# Patient Record
Sex: MALE | Race: WHITE | NOT HISPANIC OR LATINO | Employment: FULL TIME | URBAN - METROPOLITAN AREA
[De-identification: names, ages, dates, MRNs, and addresses within clinical notes are randomized per-mention and may not be internally consistent; named-entity substitution may affect disease eponyms.]

---

## 2022-09-07 ENCOUNTER — LAB (OUTPATIENT)
Dept: LAB | Facility: CLINIC | Age: 37
End: 2022-09-07
Attending: FAMILY MEDICINE
Payer: COMMERCIAL

## 2022-09-07 DIAGNOSIS — Z20.822 CLOSE EXPOSURE TO 2019 NOVEL CORONAVIRUS: ICD-10-CM

## 2022-09-07 PROCEDURE — U0005 INFEC AGEN DETEC AMPLI PROBE: HCPCS

## 2022-09-07 PROCEDURE — U0003 INFECTIOUS AGENT DETECTION BY NUCLEIC ACID (DNA OR RNA); SEVERE ACUTE RESPIRATORY SYNDROME CORONAVIRUS 2 (SARS-COV-2) (CORONAVIRUS DISEASE [COVID-19]), AMPLIFIED PROBE TECHNIQUE, MAKING USE OF HIGH THROUGHPUT TECHNOLOGIES AS DESCRIBED BY CMS-2020-01-R: HCPCS

## 2022-09-08 LAB — SARS-COV-2 RNA RESP QL NAA+PROBE: NEGATIVE

## 2022-09-19 ENCOUNTER — E-VISIT (OUTPATIENT)
Dept: FAMILY MEDICINE | Facility: CLINIC | Age: 37
End: 2022-09-19
Payer: COMMERCIAL

## 2022-09-19 DIAGNOSIS — U07.1 SARS-COV-2 POSITIVE: Primary | ICD-10-CM

## 2022-09-19 PROCEDURE — 99421 OL DIG E/M SVC 5-10 MIN: CPT | Mod: CS | Performed by: PHYSICIAN ASSISTANT

## 2022-09-19 NOTE — LETTER
September 19, 2022      Dorie Payton  [address] [address]      To Whom It May Concern:  Please excuse patient until they receive a negative COVID test. Upon negative test, they may return.         Sincerely,        Joce Nuñez PA-C

## 2022-10-11 ENCOUNTER — IMMUNIZATION (OUTPATIENT)
Dept: FAMILY MEDICINE | Facility: CLINIC | Age: 37
End: 2022-10-11
Payer: COMMERCIAL

## 2022-10-11 DIAGNOSIS — Z23 NEED FOR PROPHYLACTIC VACCINATION AND INOCULATION AGAINST INFLUENZA: ICD-10-CM

## 2022-10-11 DIAGNOSIS — Z23 HIGH PRIORITY FOR 2019-NCOV VACCINE: Primary | ICD-10-CM

## 2022-10-11 PROCEDURE — 91313 COVID-19,PF,MODERNA BIVALENT: CPT

## 2022-10-11 PROCEDURE — 90686 IIV4 VACC NO PRSV 0.5 ML IM: CPT

## 2022-10-11 PROCEDURE — 90471 IMMUNIZATION ADMIN: CPT

## 2022-10-11 PROCEDURE — 99207 PR NO CHARGE LOS: CPT

## 2022-10-11 PROCEDURE — 0134A COVID-19,PF,MODERNA BIVALENT: CPT

## 2022-10-12 ENCOUNTER — ANCILLARY PROCEDURE (OUTPATIENT)
Dept: GENERAL RADIOLOGY | Facility: CLINIC | Age: 37
End: 2022-10-12
Attending: FAMILY MEDICINE
Payer: COMMERCIAL

## 2022-10-12 ENCOUNTER — OFFICE VISIT (OUTPATIENT)
Dept: FAMILY MEDICINE | Facility: CLINIC | Age: 37
End: 2022-10-12
Payer: COMMERCIAL

## 2022-10-12 VITALS
OXYGEN SATURATION: 95 % | RESPIRATION RATE: 20 BRPM | BODY MASS INDEX: 43.01 KG/M2 | HEART RATE: 87 BPM | DIASTOLIC BLOOD PRESSURE: 86 MMHG | HEIGHT: 71 IN | SYSTOLIC BLOOD PRESSURE: 138 MMHG | WEIGHT: 307.2 LBS | TEMPERATURE: 99.1 F

## 2022-10-12 DIAGNOSIS — M25.521 RIGHT ELBOW PAIN: Primary | ICD-10-CM

## 2022-10-12 DIAGNOSIS — M25.521 RIGHT ELBOW PAIN: ICD-10-CM

## 2022-10-12 PROCEDURE — 99203 OFFICE O/P NEW LOW 30 MIN: CPT | Performed by: FAMILY MEDICINE

## 2022-10-12 PROCEDURE — 73080 X-RAY EXAM OF ELBOW: CPT | Mod: TC | Performed by: RADIOLOGY

## 2022-10-12 ASSESSMENT — PAIN SCALES - GENERAL: PAINLEVEL: MILD PAIN (2)

## 2022-10-12 NOTE — PROGRESS NOTES
"  Assessment & Plan     Right elbow pain  37-year-old male presented with right ankle pain which she has been experiencing for last 2 months or so.  No history of trauma/injury or fall.  Works in IT.  Physical examination remarkable for localized tenderness involving right lateral and anterior elbow.  No joint swelling, skin discoloration or decreased range of movement.  Differentials discussed in detail including tendinopathy.  Elbow x-ray findings reviewed independently which came back unremarkable.  Suggested over-the-counter oral/topical analgesia, activity as tolerated, elbow brace and orthopedic referral placed for further review and recommendations.  Patient understood and in agreement with above plan.  All questions answered.  - Orthopedic  Referral; Future  - XR Elbow Right G/E 3 Views; Future      Jefferson Canales MD  [facility]    Mirlande Garnica is a 37 year old, presenting for the following health issues:  Elbow Pain (/)      Elbow Pain    History of Present Illness       Reason for visit:  Right elbow pain  Symptom onset:  3-4 weeks ago  Symptoms include:  Slight shooting pain to hand during movement, pain when picking things up.  Symptom intensity:  Moderate  Symptom progression:  Staying the same  Had these symptoms before:  Yes  Has tried/received treatment for these symptoms:  No        Review of Systems   Constitutional, HEENT, cardiovascular, pulmonary, gi and gu systems are negative, except as otherwise noted.      Objective    /86 (BP Location: Right arm, Patient Position: Sitting, Cuff Size: Adult Large)   Pulse 87   Temp 99.1  F (37.3  C) (Tympanic)   Resp 20   Ht 1.803 m (5' 11\")   Wt 139.3 kg (307 lb 3.2 oz)   SpO2 95%   BMI 42.85 kg/m    Body mass index is 42.85 kg/m .  Physical Exam   GENERAL: alert and no distress  RESP: no wheezes  MS: mildly localized right lateral elbow tenderness, no joint swelling, skin discoloration or " decreased range of movement, normal upper and lower extremities strength, peripheral pulses 3+ bilaterally  SKIN: no suspicious lesions or rashes  NEURO: Normal strength and tone, mentation intact and speech normal  PSYCH: mentation appears normal, affect normal/bright    Results for orders placed or performed in visit on 10/12/22   XR Elbow Right G/E 3 Views     Status: None    Narrative    XR ELBOW RIGHT G/E 3 VIEWS   10/12/2022 4:18 PM     HISTORY:  Right elbow pain    Comparison: None.      Impression    IMPRESSION: Normal. No fracture or effusion.    ABBY NORTON MD         SYSTEM ID:  [id number]

## 2022-11-04 ENCOUNTER — OFFICE VISIT (OUTPATIENT)
Dept: ORTHOPEDICS | Facility: CLINIC | Age: 37
End: 2022-11-04
Payer: COMMERCIAL

## 2022-11-04 VITALS
WEIGHT: 307 LBS | SYSTOLIC BLOOD PRESSURE: 132 MMHG | HEIGHT: 71 IN | BODY MASS INDEX: 42.98 KG/M2 | DIASTOLIC BLOOD PRESSURE: 74 MMHG

## 2022-11-04 DIAGNOSIS — M67.921 BICEPS TENDINOPATHY, RIGHT: Primary | ICD-10-CM

## 2022-11-04 DIAGNOSIS — M77.11 LATERAL EPICONDYLITIS OF RIGHT ELBOW: ICD-10-CM

## 2022-11-04 DIAGNOSIS — M25.521 RIGHT ELBOW PAIN: ICD-10-CM

## 2022-11-04 PROCEDURE — 99203 OFFICE O/P NEW LOW 30 MIN: CPT | Performed by: FAMILY MEDICINE

## 2022-11-04 NOTE — PROGRESS NOTES
ASSESSMENT & PLAN    Nahun was seen today for pain.    Diagnoses and all orders for this visit:    Right elbow pain  -     Orthopedic  Referral      This issue is acute and Worsening.    # Right Elbow Pain: Patient noting symptoms over the past few months without inciting injury. He does have pain over the distal biceps tendon as well as the common extensor tendon the outside of his elbow. There is pain with resisted biceps tendon, extensor tendon testing on exam today. X-rays reviewed with patient showing no significant arthritis, no fracture. Likely cause of his pain due to irritated biceps and common extensor tendon in his elbow. Counseled patient treatment options including home exercises, hand therapy. Given this plan to treat as below and follow-up as needed..  Image Findings: negative right elbow  Treatment: Activities as tolerated, home exercises given today  Job: as tolerated  Medications/Injections: Limited tylenol/ibuprofen for pain for 1-2 weeks, none  Follow-up: In one month if symptoms do not improve, sooner if worsening  Can consider formal hand therapy    Jc Ramos MD  [facility] [place]    -----  Chief Complaint   Patient presents with     Right Elbow - Pain       SUBJECTIVE  Nahun Payton is a/an 37 year old male who is seen in consultation at the request of  Jefferson Canales M.D. for evaluation of right elbow pain.     The patient is seen by themselves.  The patient is Right handed    Onset: 2-3 month(s) ago. Reports insidious onset without acute precipitating event. Saw PCP 10/12/22 and xrays were performed. Reviewed PCP note and right elbow x-rays  Location of Pain: right lateral and anterior elbow  Worsened by: full extension, supination, gripping, lifting   Better with: nothing   Treatments tried: rest/activity avoidance, Ibuprofen, Aleve   Associated symptoms: numbness and tingling    Orthopedic/Surgical history: NO  Social  "History/Occupation: IT    No family history pertinent to patient's problem today.      REVIEW OF SYSTEMS:  Review of Systems  Constitutional, HEENT, cardiovascular, pulmonary, GI, , musculoskeletal, neuro, skin, endocrine and psych systems are negative, except as otherwise noted.    OBJECTIVE:  Ht 1.803 m (5' 11\")   Wt 139.3 kg (307 lb)   BMI 42.82 kg/m     General: healthy, alert and in no distress  HEENT: no scleral icterus or conjunctival erythema  Skin: no suspicious lesions or rash. No jaundice.  CV: distal perfusion intact    Resp: normal respiratory effort without conversational dyspnea   Psych: normal mood and affect  Gait: normal steady gait with appropriate coordination and balance    Neuro: Normal light sensory exam of right upper extremity     Ortho Exam   RIGHT ELBOW  Inspection:    No swelling, bruising, discoloration, or obvious deformity or asymmetry  Palpation:    Tender about the lateral epicondyle, common extensor tendon, distal bicep tendon. Remainder of bony, ligamentous and tendinous landmarks are nontender.    Crepitus is Absent  Range of Motion:     Extension full / flexion full / pronation full / supination full  Strength:    Flexion full extension full pronation full supination limited slightly by pain  Special Tests:    Positive: Pain with resisted wrist extension, pain with resisted middle finger extension, pain with resisted supination    Negative: pain with resisted wrist flexion, passive valgus stress, pain with resisted pronation, milking manuever, varus stress, cubital tunnel (ulnar Tinel's)      RADIOLOGY:  I independently, visualized and reviewed these images with the patient     XR ELBOW RIGHT G/E 3 VIEWS   10/12/2022 4:18 PM      HISTORY:  Right elbow pain     Comparison: None.                                                                      IMPRESSION: Normal. No fracture or effusion.     ABBY NORTON MD     Review of external notes as documented elsewhere in " note  Review of the result(s) of each unique test - right elbow x-rays       Disclaimer: This note consists of symbols derived from keyboarding, dictation and/or voice recognition software. As a result, there may be errors in the script that have gone undetected. Please consider this when interpreting information found in this chart.

## 2022-11-04 NOTE — PATIENT INSTRUCTIONS
# Right Elbow Pain: Patient noting symptoms over the past few months without inciting injury. He does have pain over the distal biceps tendon as well as the common extensor tendon the outside of his elbow. There is pain with resisted biceps tendon, extensor tendon testing on exam today. X-rays reviewed with patient showing no significant arthritis, no fracture. Likely cause of his pain due to irritated biceps and common extensor tendon in his elbow. Counseled patient treatment options including home exercises, hand therapy. Given this plan to treat as below and follow-up as needed..  Image Findings: negative right elbow  Treatment: Activities as tolerated, home exercises given today  Job: as tolerated  Medications/Injections: Limited tylenol/ibuprofen for pain for 1-2 weeks, none  Follow-up: In one month if symptoms do not improve, sooner if worsening  Can consider formal hand therapy    Please call [phone]   Ask for my team if you have any questions or concerns    If you have not yet received the influenza vaccine but would like to get one, please call  [phone] or you can schedule via [employer]    It was great seeing you today!    Jc Ramos MD, CAM

## 2022-11-04 NOTE — LETTER
11/4/2022         RE: Nahun Payton  [address] [facility] [address]        Dear Colleague,    Thank you for referring your patient, Nahun Payton, to the [facility]. Please see a copy of my visit note below.    ASSESSMENT & PLAN    Nahun was seen today for pain.    Diagnoses and all orders for this visit:    Right elbow pain  -     Orthopedic  Referral      This issue is acute and Worsening.    # Right Elbow Pain: Patient noting symptoms over the past few months without inciting injury. He does have pain over the distal biceps tendon as well as the common extensor tendon the outside of his elbow. There is pain with resisted biceps tendon, extensor tendon testing on exam today. X-rays reviewed with patient showing no significant arthritis, no fracture. Likely cause of his pain due to irritated biceps and common extensor tendon in his elbow. Counseled patient treatment options including home exercises, hand therapy. Given this plan to treat as below and follow-up as needed..  Image Findings: negative right elbow  Treatment: Activities as tolerated, home exercises given today  Job: as tolerated  Medications/Injections: Limited tylenol/ibuprofen for pain for 1-2 weeks, none  Follow-up: In one month if symptoms do not improve, sooner if worsening  Can consider formal hand therapy    Jc Ramos MD  [facility]    -----  Chief Complaint   Patient presents with     Right Elbow - Pain       SUBJECTIVE  Nahun Payton is a/an 37 year old male who is seen in consultation at the request of  Jefferson Canales M.D. for evaluation of right elbow pain.     The patient is seen by themselves.  The patient is Right handed    Onset: 2-3 month(s) ago. Reports insidious onset without acute precipitating event. Saw PCP 10/12/22 and xrays were performed. Reviewed PCP note and right elbow  "x-rays  Location of Pain: right lateral and anterior elbow  Worsened by: full extension, supination, gripping, lifting   Better with: nothing   Treatments tried: rest/activity avoidance, Ibuprofen, Aleve   Associated symptoms: numbness and tingling    Orthopedic/Surgical history: NO  Social History/Occupation: IT    No family history pertinent to patient's problem today.      REVIEW OF SYSTEMS:  Review of Systems  Constitutional, HEENT, cardiovascular, pulmonary, GI, , musculoskeletal, neuro, skin, endocrine and psych systems are negative, except as otherwise noted.    OBJECTIVE:  Ht 1.803 m (5' 11\")   Wt 139.3 kg (307 lb)   BMI 42.82 kg/m     General: healthy, alert and in no distress  HEENT: no scleral icterus or conjunctival erythema  Skin: no suspicious lesions or rash. No jaundice.  CV: distal perfusion intact    Resp: normal respiratory effort without conversational dyspnea   Psych: normal mood and affect  Gait: normal steady gait with appropriate coordination and balance    Neuro: Normal light sensory exam of right upper extremity     Ortho Exam   RIGHT ELBOW  Inspection:    No swelling, bruising, discoloration, or obvious deformity or asymmetry  Palpation:    Tender about the lateral epicondyle, common extensor tendon, distal bicep tendon. Remainder of bony, ligamentous and tendinous landmarks are nontender.    Crepitus is Absent  Range of Motion:     Extension full / flexion full / pronation full / supination full  Strength:    Flexion full extension full pronation full supination limited slightly by pain  Special Tests:    Positive: Pain with resisted wrist extension, pain with resisted middle finger extension, pain with resisted supination    Negative: pain with resisted wrist flexion, passive valgus stress, pain with resisted pronation, milking manuever, varus stress, cubital tunnel (ulnar Tinel's)      RADIOLOGY:  I independently, visualized and reviewed these images with the patient     XR ELBOW " RIGHT G/E 3 VIEWS   10/12/2022 4:18 PM      HISTORY:  Right elbow pain     Comparison: None.                                                                      IMPRESSION: Normal. No fracture or effusion.     ABBY NORTON MD     Review of external notes as documented elsewhere in note  Review of the result(s) of each unique test - right elbow x-rays       Disclaimer: This note consists of symbols derived from keyboarding, dictation and/or voice recognition software. As a result, there may be errors in the script that have gone undetected. Please consider this when interpreting information found in this chart.        Again, thank you for allowing me to participate in the care of your patient.        Sincerely,        Jc Ramos MD

## 2022-11-19 ENCOUNTER — HEALTH MAINTENANCE LETTER (OUTPATIENT)
Age: 37
End: 2022-11-19

## 2023-04-24 ASSESSMENT — ENCOUNTER SYMPTOMS
HEMATOCHEZIA: 0
ARTHRALGIAS: 0
FEVER: 0
JOINT SWELLING: 0
ABDOMINAL PAIN: 0
HEARTBURN: 0
DYSURIA: 0
EYE PAIN: 0
WEAKNESS: 0
HEMATURIA: 0
COUGH: 0
SORE THROAT: 0
DIZZINESS: 0
SHORTNESS OF BREATH: 0
CHILLS: 0
NAUSEA: 0
MYALGIAS: 0
DIARRHEA: 0
FREQUENCY: 0
HEADACHES: 0
CONSTIPATION: 0
PARESTHESIAS: 0
PALPITATIONS: 0
NERVOUS/ANXIOUS: 0

## 2023-04-25 ENCOUNTER — OFFICE VISIT (OUTPATIENT)
Dept: FAMILY MEDICINE | Facility: CLINIC | Age: 38
End: 2023-04-25
Payer: COMMERCIAL

## 2023-04-25 VITALS
RESPIRATION RATE: 18 BRPM | HEIGHT: 71 IN | OXYGEN SATURATION: 98 % | TEMPERATURE: 97.8 F | WEIGHT: 309 LBS | BODY MASS INDEX: 43.26 KG/M2 | DIASTOLIC BLOOD PRESSURE: 82 MMHG | SYSTOLIC BLOOD PRESSURE: 120 MMHG | HEART RATE: 55 BPM

## 2023-04-25 DIAGNOSIS — Z11.4 SCREENING FOR HIV (HUMAN IMMUNODEFICIENCY VIRUS): ICD-10-CM

## 2023-04-25 DIAGNOSIS — Z00.00 ROUTINE GENERAL MEDICAL EXAMINATION AT A HEALTH CARE FACILITY: Primary | ICD-10-CM

## 2023-04-25 DIAGNOSIS — Z11.4 ENCOUNTER FOR SCREENING FOR HIV: ICD-10-CM

## 2023-04-25 DIAGNOSIS — Z11.59 NEED FOR HEPATITIS C SCREENING TEST: ICD-10-CM

## 2023-04-25 PROCEDURE — 90715 TDAP VACCINE 7 YRS/> IM: CPT | Performed by: FAMILY MEDICINE

## 2023-04-25 PROCEDURE — 99395 PREV VISIT EST AGE 18-39: CPT | Mod: 25 | Performed by: FAMILY MEDICINE

## 2023-04-25 PROCEDURE — 90471 IMMUNIZATION ADMIN: CPT | Performed by: FAMILY MEDICINE

## 2023-04-25 ASSESSMENT — PAIN SCALES - GENERAL: PAINLEVEL: NO PAIN (0)

## 2023-04-25 ASSESSMENT — ENCOUNTER SYMPTOMS
DIZZINESS: 0
ARTHRALGIAS: 0
HEMATOCHEZIA: 0
ACTIVITY CHANGE: 0
BRUISES/BLEEDS EASILY: 0
NERVOUS/ANXIOUS: 0
PARESTHESIAS: 0
DYSURIA: 0
BACK PAIN: 0
ADENOPATHY: 0
HEMATURIA: 0
APPETITE CHANGE: 0
EYE PAIN: 0
CONSTIPATION: 0
HEARTBURN: 0
DIARRHEA: 0
PALPITATIONS: 0
FREQUENCY: 0
AGITATION: 0
SHORTNESS OF BREATH: 0

## 2023-04-25 NOTE — PROGRESS NOTES
SUBJECTIVE:   CC: Nahun is an 37 year old who presents for preventative health visit.   Patient has been advised of split billing requirements and indicates understanding: Yes  Healthy Habits:     Getting at least 3 servings of Calcium per day:  Yes    Bi-annual eye exam:  NO    Dental care twice a year:  Yes    Sleep apnea or symptoms of sleep apnea:  None    Diet:  Regular (no restrictions)    Frequency of exercise:  None    Taking medications regularly:  Not Applicable    Barriers to taking medications:  Not applicable    Medication side effects:  Not applicable    PHQ-2 Total Score: 0    Additional concerns today:  Yes      PROBLEMS TO ADD ON...      Today's PHQ-2 Score:       4/25/2023     4:18 PM   PHQ-2 ( 1999 [employer])   Q1: Little interest or pleasure in doing things 0   Q2: Feeling down, depressed or hopeless 0   PHQ-2 Score 0   Q1: Little interest or pleasure in doing things Not at all   Q2: Feeling down, depressed or hopeless Not at all   PHQ-2 Score 0       Have you ever done Advance Care Planning? (For example, a Health Directive, POLST, or a discussion with a medical provider or your loved ones about your wishes): No, advance care planning information given to patient to review.  Patient declined advance care planning discussion at this time.    Social History     Tobacco Use     Smoking status: Never     Passive exposure: Never     Smokeless tobacco: Never   Vaping Use     Vaping status: Every Day   Substance Use Topics     Alcohol use: Yes             4/24/2023     6:13 PM   Alcohol Use   Prescreen: >3 drinks/day or >7 drinks/week? No       Last PSA: No results found for: PSA    Reviewed orders with patient. Reviewed health maintenance and updated orders accordingly - Yes  Lab work is in process  Labs reviewed in EPIC  BP Readings from Last 3 Encounters:   04/25/23 120/82   11/04/22 132/74   10/12/22 138/86    Wt Readings from Last 3 Encounters:   04/25/23 140.2 kg (309 lb)   11/04/22 139.3 kg (307  "lb)   10/12/22 139.3 kg (307 lb 3.2 oz)                  There is no problem list on file for this patient.    No past surgical history on file.    Social History     Tobacco Use     Smoking status: Never     Passive exposure: Never     Smokeless tobacco: Never   Vaping Use     Vaping status: Every Day   Substance Use Topics     Alcohol use: Yes     No family history on file.      No current outpatient medications on file.     No Known Allergies  No lab results found.     Reviewed and updated as needed this visit by clinical staff   Tobacco  Allergies  Meds              Reviewed and updated as needed this visit by Provider                     Review of Systems   Constitutional: Negative for activity change and appetite change.   HENT: Negative for congestion, ear pain and hearing loss.    Eyes: Negative for pain and visual disturbance.   Respiratory: Negative for shortness of breath.    Cardiovascular: Negative for palpitations and peripheral edema.   Gastrointestinal: Negative for constipation, diarrhea, heartburn and hematochezia.   Endocrine: Negative for cold intolerance and heat intolerance.   Genitourinary: Negative for dysuria, frequency, genital sores, hematuria, impotence, penile discharge and urgency.   Musculoskeletal: Negative for arthralgias and back pain.   Allergic/Immunologic: Negative for environmental allergies and food allergies.   Neurological: Negative for dizziness and paresthesias.   Hematological: Negative for adenopathy. Does not bruise/bleed easily.   Psychiatric/Behavioral: Negative for agitation, behavioral problems and mood changes. The patient is not nervous/anxious.          OBJECTIVE:   /82 (Cuff Size: Adult Large)   Pulse 55   Temp 97.8  F (36.6  C) (Tympanic)   Resp 18   Ht 1.803 m (5' 11\")   Wt 140.2 kg (309 lb)   SpO2 98%   BMI 43.10 kg/m      Physical Exam  GENERAL: alert, no distress and obese  EYES: Eyes grossly normal to inspection, PERRL and conjunctivae and " "sclerae normal  HENT: ear canals and TM's normal, nose and mouth without ulcers or lesions  NECK: no adenopathy, no asymmetry, masses, or scars and thyroid normal to palpation  RESP: lungs clear to auscultation - no rales, rhonchi or wheezes  CV: regular rate and rhythm, normal S1 S2, no S3 or S4, no murmur, click or rub, no peripheral edema and peripheral pulses strong  ABDOMEN: soft, nontender, no hepatosplenomegaly, no masses and bowel sounds normal  MS: no gross musculoskeletal defects noted, no edema  SKIN: no suspicious lesions or rashes  NEURO: Normal strength and tone, mentation intact and speech normal  PSYCH: mentation appears normal, affect normal/bright    Wt Readings from Last 10 Encounters:   04/25/23 140.2 kg (309 lb)   11/04/22 139.3 kg (307 lb)   10/12/22 139.3 kg (307 lb 3.2 oz)       ASSESSMENT/PLAN:   (Z00.00) Routine general medical examination at a health care facility  (primary encounter diagnosis)  Comment: Medically doing well.  Physical examination unremarkable.  Recommended regular exercise, healthy diet and weight loss.  Fasting glucose, lipid panel, HIV, hepatitis C screening test ordered.  Tdap vaccine administered in office today.  Recommended to avoid vaping  Plan: Lipid panel reflex to direct LDL Fasting,         Glucose            (Z11.4) Screening for HIV (human immunodeficiency virus)  Comment:   Plan:     (Z11.59) Need for hepatitis C screening test  Comment:   Plan: Hepatitis C Screen Reflex to HCV RNA Quant and         Genotype            (Z11.4) Encounter for screening for HIV  Comment:   Plan: HIV Antigen Antibody Combo              COUNSELING:   Reviewed preventive health counseling, as reflected in patient instructions      BMI:   Estimated body mass index is 43.1 kg/m  as calculated from the following:    Height as of this encounter: 1.803 m (5' 11\").    Weight as of this encounter: 140.2 kg (309 lb).   Weight management plan: Discussed healthy diet and exercise " guidelines      He reports that he has never smoked. He has never been exposed to tobacco smoke. He has never used smokeless tobacco.        Jefferson Canales MD  [facility]

## 2023-04-25 NOTE — NURSING NOTE
"Chief Complaint   Patient presents with     Physical     /82 (Cuff Size: Adult Large)   Pulse 55   Temp 97.8  F (36.6  C) (Tympanic)   Resp 18   Ht 1.803 m (5' 11\")   Wt 140.2 kg (309 lb)   SpO2 98%   BMI 43.10 kg/m   Estimated body mass index is 43.1 kg/m  as calculated from the following:    Height as of this encounter: 1.803 m (5' 11\").    Weight as of this encounter: 140.2 kg (309 lb).  Patient presents to the clinic using No DME      Health Maintenance that is potentially due pending provider review:    Health Maintenance Due   Topic Date Due     NICOTINE/TOBACCO CESSATION COUNSELING Q 1 YR  Never done     HIV SCREENING  Never done     HEPATITIS C SCREENING  Never done     LIPID  Never done     DTAP/TDAP/TD IMMUNIZATION (2 - Td or Tdap) 02/04/2021     COVID-19 Vaccine (2 - Moderna series) 10/11/2022                "

## 2023-05-10 ENCOUNTER — LAB (OUTPATIENT)
Dept: LAB | Facility: CLINIC | Age: 38
End: 2023-05-10
Payer: COMMERCIAL

## 2023-05-10 DIAGNOSIS — Z00.00 ROUTINE GENERAL MEDICAL EXAMINATION AT A HEALTH CARE FACILITY: ICD-10-CM

## 2023-05-10 DIAGNOSIS — Z11.59 NEED FOR HEPATITIS C SCREENING TEST: ICD-10-CM

## 2023-05-10 DIAGNOSIS — Z11.4 ENCOUNTER FOR SCREENING FOR HIV: ICD-10-CM

## 2023-05-10 LAB
CHOLEST SERPL-MCNC: 174 MG/DL
FASTING STATUS PATIENT QL REPORTED: YES
GLUCOSE SERPL-MCNC: 103 MG/DL (ref 70–99)
HDLC SERPL-MCNC: 45 MG/DL
LDLC SERPL CALC-MCNC: 102 MG/DL
NONHDLC SERPL-MCNC: 129 MG/DL
TRIGL SERPL-MCNC: 134 MG/DL

## 2023-05-10 PROCEDURE — 87389 HIV-1 AG W/HIV-1&-2 AB AG IA: CPT

## 2023-05-10 PROCEDURE — 80061 LIPID PANEL: CPT

## 2023-05-10 PROCEDURE — 82947 ASSAY GLUCOSE BLOOD QUANT: CPT

## 2023-05-10 PROCEDURE — 36415 COLL VENOUS BLD VENIPUNCTURE: CPT

## 2023-05-10 PROCEDURE — 86803 HEPATITIS C AB TEST: CPT

## 2023-05-11 LAB
HCV AB SERPL QL IA: NONREACTIVE
HIV 1+2 AB+HIV1 P24 AG SERPL QL IA: NONREACTIVE

## 2023-09-11 ENCOUNTER — IMMUNIZATION (OUTPATIENT)
Dept: FAMILY MEDICINE | Facility: CLINIC | Age: 38
End: 2023-09-11
Payer: COMMERCIAL

## 2023-09-11 PROCEDURE — 90686 IIV4 VACC NO PRSV 0.5 ML IM: CPT

## 2023-09-11 PROCEDURE — 90471 IMMUNIZATION ADMIN: CPT

## 2023-10-27 ENCOUNTER — OFFICE VISIT (OUTPATIENT)
Dept: URGENT CARE | Facility: URGENT CARE | Age: 38
End: 2023-10-27
Payer: COMMERCIAL

## 2023-10-27 ENCOUNTER — ANCILLARY PROCEDURE (OUTPATIENT)
Dept: GENERAL RADIOLOGY | Facility: CLINIC | Age: 38
End: 2023-10-27
Attending: PHYSICIAN ASSISTANT
Payer: COMMERCIAL

## 2023-10-27 VITALS
OXYGEN SATURATION: 96 % | BODY MASS INDEX: 42.12 KG/M2 | RESPIRATION RATE: 16 BRPM | HEART RATE: 71 BPM | SYSTOLIC BLOOD PRESSURE: 125 MMHG | TEMPERATURE: 98 F | WEIGHT: 302 LBS | DIASTOLIC BLOOD PRESSURE: 84 MMHG

## 2023-10-27 DIAGNOSIS — R05.1 ACUTE COUGH: Primary | ICD-10-CM

## 2023-10-27 DIAGNOSIS — R05.1 ACUTE COUGH: ICD-10-CM

## 2023-10-27 PROCEDURE — 99214 OFFICE O/P EST MOD 30 MIN: CPT | Performed by: PHYSICIAN ASSISTANT

## 2023-10-27 PROCEDURE — 71046 X-RAY EXAM CHEST 2 VIEWS: CPT | Mod: TC | Performed by: RADIOLOGY

## 2023-10-27 RX ORDER — ALBUTEROL SULFATE 90 UG/1
AEROSOL, METERED RESPIRATORY (INHALATION)
Qty: 18 G | Refills: 0 | Status: SHIPPED | OUTPATIENT
Start: 2023-10-27

## 2023-10-27 RX ORDER — PREDNISONE 20 MG/1
40 TABLET ORAL DAILY
Qty: 10 TABLET | Refills: 0 | Status: SHIPPED | OUTPATIENT
Start: 2023-10-27 | End: 2023-11-01

## 2023-10-27 NOTE — PROGRESS NOTES
"  Assessment & Plan     Acute cough  Reassurance, chest x-ray is clear. Will treat with prednisone burst and albuterol every 4-6 hrs as needed. Continue with supportive care. Return to clinic if symptoms worsen or do not improve; otherwise follow up as needed     - XR Chest 2 Views; Future  - albuterol (PROAIR HFA/PROVENTIL HFA/VENTOLIN HFA) 108 (90 Base) MCG/ACT inhaler; Inhale 2 puffs every 4-6 hours as needed for cough, wheezing, or shortness of breath  - predniSONE (DELTASONE) 20 MG tablet; Take 2 tablets (40 mg) by mouth daily for 5 days             BMI:   Estimated body mass index is 42.12 kg/m  as calculated from the following:    Height as of 4/25/23: 1.803 m (5' 11\").    Weight as of this encounter: 137 kg (302 lb).           Return in about 1 week (around 11/3/2023), or if symptoms worsen or fail to improve.    MARGO Desai [facility] [place]              Subjective   Chief Complaint   Patient presents with    Cough     X 1 week, short of breath, coughs mostly at night, keeps him up at night, hears some crackling in lungs, feels like something on his chest       HPI     URI Adult    Onset of symptoms was 1 week(s) ago.  Course of illness is worsening.    Severity moderate  Current and Associated symptoms: cough, shortness of breath   Treatment measures tried include OTC Cough med.  Predisposing factors include None.                  Review of Systems         Objective    /84   Pulse 71   Temp 98  F (36.7  C) (Tympanic)   Resp 16   Wt 137 kg (302 lb)   SpO2 96%   BMI 42.12 kg/m    Body mass index is 42.12 kg/m .  Physical Exam  Constitutional:       General: He is not in acute distress.     Appearance: He is well-developed.   HENT:      Head: Normocephalic and atraumatic.      Right Ear: Tympanic membrane and ear canal normal.      Left Ear: Tympanic membrane and ear canal normal.   Eyes:      Conjunctiva/sclera: Conjunctivae normal.   Cardiovascular:      Rate " and Rhythm: Normal rate and regular rhythm.   Pulmonary:      Effort: Pulmonary effort is normal.      Breath sounds: Normal breath sounds.   Skin:     General: Skin is warm and dry.      Findings: No rash.   Psychiatric:         Behavior: Behavior normal.            Xray - Reviewed and interpreted by me.  No acute infiltrates

## 2024-06-16 ENCOUNTER — HEALTH MAINTENANCE LETTER (OUTPATIENT)
Age: 39
End: 2024-06-16

## 2024-11-25 ENCOUNTER — IMMUNIZATION (OUTPATIENT)
Dept: FAMILY MEDICINE | Facility: CLINIC | Age: 39
End: 2024-11-25
Payer: COMMERCIAL

## 2024-11-25 PROCEDURE — 90471 IMMUNIZATION ADMIN: CPT

## 2024-11-25 PROCEDURE — 90480 ADMN SARSCOV2 VAC 1/ONLY CMP: CPT

## 2024-11-25 PROCEDURE — 91320 SARSCV2 VAC 30MCG TRS-SUC IM: CPT

## 2024-11-25 PROCEDURE — 90656 IIV3 VACC NO PRSV 0.5 ML IM: CPT

## 2024-12-26 ENCOUNTER — OFFICE VISIT (OUTPATIENT)
Dept: URGENT CARE | Facility: URGENT CARE | Age: 39
End: 2024-12-26
Payer: COMMERCIAL

## 2024-12-26 VITALS
OXYGEN SATURATION: 97 % | HEART RATE: 68 BPM | TEMPERATURE: 97.8 F | WEIGHT: 315 LBS | SYSTOLIC BLOOD PRESSURE: 141 MMHG | BODY MASS INDEX: 44.49 KG/M2 | DIASTOLIC BLOOD PRESSURE: 92 MMHG | RESPIRATION RATE: 16 BRPM

## 2024-12-26 DIAGNOSIS — R10.31 RLQ ABDOMINAL PAIN: Primary | ICD-10-CM

## 2024-12-27 NOTE — PROGRESS NOTES
URGENT CARE  Assessment & Plan   Assessment:   Nahun Payton is a 39 year old male who's clinical presentation today is consistent with:   1. RLQ abdominal pain   Plan:  Sending patient to ED for higher level of care; given their acute abdominal pain.   Educated patient there is a possibility something more serious is happening with regard to their abdominal pain, however at the  we do not have access CT scan, will self drive     ROSITA Muñoz [facility] URGENT CARE [place]      ______________________________________________________________________      Subjective     HPI: Nahun Payton  is a 39 year old  male who presents today for evaluation the following concerns:   Patient presents today complaining of abdominal pain, which started 2-3 days ago   Patient localizes the pain to the RLQ   Patient states the onset was  insidious} in nature   And they describe their pain as sharp at times but can be intermittent    Patient denies any decreased appetite, nausea, vomiting, diarrhea, constipation, blood in stool,} dysuria, frequency, blood in urine, fever, chills, denies changes to bowels       Review of Systems:  Pertinent review of systems as reflected in HPI, otherwise negative.     Objective    Physical Exam:  Vitals:    12/26/24 1826   BP: (!) 141/92   Pulse: 68   Resp: 16   Temp: 97.8  F (36.6  C)   TempSrc: Tympanic   SpO2: 97%   Weight: 144.7 kg (319 lb)      General:   alert and oriented, no acute distress, non ill-appearing   Vital signs reviewed: afebrile and normotensive   ABD: Bowel sounds hypoactive  in all  quadrants   soft,  non-distended, obese   Tender to palpation RLQ    No rebound tenderness appreciated   No tympany, no organomegaly, no masses palpable,   ______________________________________________________________________    I explained my diagnostic considerations and recommendations to the patient, who voiced understanding and agreement with the treatment  plan.   All questions were answered.   We discussed potential side effects, risks and benefits of any prescribed or recommended therapies, as well as expectations for response to treatments.  Please see AVS for any patient instructions & handouts given.   Patient was advised to contact the Nurse Care Line, their Primary Care provider, Urgent Care, or the Emergency Department if there are new or worsening symptoms, or call 911 for emergencies.

## 2025-01-03 ENCOUNTER — APPOINTMENT (OUTPATIENT)
Dept: CT IMAGING | Facility: CLINIC | Age: 40
End: 2025-01-03
Payer: COMMERCIAL

## 2025-01-03 ENCOUNTER — HOSPITAL ENCOUNTER (EMERGENCY)
Facility: CLINIC | Age: 40
Discharge: HOME | End: 2025-01-03
Payer: COMMERCIAL

## 2025-01-03 VITALS
RESPIRATION RATE: 20 BRPM | WEIGHT: 315 LBS | HEART RATE: 56 BPM | DIASTOLIC BLOOD PRESSURE: 99 MMHG | HEIGHT: 71 IN | TEMPERATURE: 98.1 F | OXYGEN SATURATION: 96 % | BODY MASS INDEX: 44.1 KG/M2 | SYSTOLIC BLOOD PRESSURE: 166 MMHG

## 2025-01-03 DIAGNOSIS — R10.33 PERIUMBILICAL ABDOMINAL PAIN: ICD-10-CM

## 2025-01-03 LAB
ALBUMIN SERPL BCG-MCNC: 4.9 G/DL (ref 3.5–5.2)
ALBUMIN UR-MCNC: 10 MG/DL
ALP SERPL-CCNC: 81 U/L (ref 40–150)
ALT SERPL W P-5'-P-CCNC: 59 U/L (ref 0–70)
ANION GAP SERPL CALCULATED.3IONS-SCNC: 14 MMOL/L (ref 7–15)
APPEARANCE UR: CLEAR
AST SERPL W P-5'-P-CCNC: 28 U/L (ref 0–45)
BASOPHILS # BLD AUTO: 0.1 10E3/UL (ref 0–0.2)
BASOPHILS NFR BLD AUTO: 1 %
BILIRUB SERPL-MCNC: 0.5 MG/DL
BILIRUB UR QL STRIP: NEGATIVE
BUN SERPL-MCNC: 13.8 MG/DL (ref 6–20)
CALCIUM SERPL-MCNC: 9.7 MG/DL (ref 8.8–10.4)
CHLORIDE SERPL-SCNC: 102 MMOL/L (ref 98–107)
COLOR UR AUTO: ABNORMAL
CREAT SERPL-MCNC: 0.93 MG/DL (ref 0.67–1.17)
EGFRCR SERPLBLD CKD-EPI 2021: >90 ML/MIN/1.73M2
EOSINOPHIL # BLD AUTO: 0.1 10E3/UL (ref 0–0.7)
EOSINOPHIL NFR BLD AUTO: 1 %
ERYTHROCYTE [DISTWIDTH] IN BLOOD BY AUTOMATED COUNT: 13.2 % (ref 10–15)
GLUCOSE SERPL-MCNC: 94 MG/DL (ref 70–99)
GLUCOSE UR STRIP-MCNC: NEGATIVE MG/DL
HCO3 SERPL-SCNC: 24 MMOL/L (ref 22–29)
HCT VFR BLD AUTO: 46 % (ref 40–53)
HGB BLD-MCNC: 15.1 G/DL (ref 13.3–17.7)
HGB UR QL STRIP: NEGATIVE
IMM GRANULOCYTES # BLD: 0.1 10E3/UL
IMM GRANULOCYTES NFR BLD: 1 %
KETONES UR STRIP-MCNC: NEGATIVE MG/DL
LEUKOCYTE ESTERASE UR QL STRIP: NEGATIVE
LIPASE SERPL-CCNC: 29 U/L (ref 13–60)
LYMPHOCYTES # BLD AUTO: 3.2 10E3/UL (ref 0.8–5.3)
LYMPHOCYTES NFR BLD AUTO: 37 %
MCH RBC QN AUTO: 26.4 PG (ref 26.5–33)
MCHC RBC AUTO-ENTMCNC: 32.8 G/DL (ref 31.5–36.5)
MCV RBC AUTO: 80 FL (ref 78–100)
MONOCYTES # BLD AUTO: 0.6 10E3/UL (ref 0–1.3)
MONOCYTES NFR BLD AUTO: 7 %
MUCOUS THREADS #/AREA URNS LPF: PRESENT /LPF
NEUTROPHILS # BLD AUTO: 4.6 10E3/UL (ref 1.6–8.3)
NEUTROPHILS NFR BLD AUTO: 53 %
NITRATE UR QL: NEGATIVE
NRBC # BLD AUTO: 0 10E3/UL
NRBC BLD AUTO-RTO: 0 /100
PH UR STRIP: 5.5 [PH] (ref 5–7)
PLATELET # BLD AUTO: 295 10E3/UL (ref 150–450)
POTASSIUM SERPL-SCNC: 4 MMOL/L (ref 3.4–5.3)
PROT SERPL-MCNC: 8 G/DL (ref 6.4–8.3)
RBC # BLD AUTO: 5.72 10E6/UL (ref 4.4–5.9)
RBC URINE: 1 /HPF
SODIUM SERPL-SCNC: 140 MMOL/L (ref 135–145)
SP GR UR STRIP: 1.02 (ref 1–1.03)
SQUAMOUS EPITHELIAL: <1 /HPF
UROBILINOGEN UR STRIP-MCNC: NORMAL MG/DL
WBC # BLD AUTO: 8.7 10E3/UL (ref 4–11)
WBC URINE: 1 /HPF

## 2025-01-03 PROCEDURE — 99285 EMERGENCY DEPT VISIT HI MDM: CPT | Mod: 25

## 2025-01-03 PROCEDURE — 74177 CT ABD & PELVIS W/CONTRAST: CPT

## 2025-01-03 PROCEDURE — 84132 ASSAY OF SERUM POTASSIUM: CPT

## 2025-01-03 PROCEDURE — 36415 COLL VENOUS BLD VENIPUNCTURE: CPT

## 2025-01-03 PROCEDURE — 85004 AUTOMATED DIFF WBC COUNT: CPT

## 2025-01-03 PROCEDURE — 250N000011 HC RX IP 250 OP 636

## 2025-01-03 PROCEDURE — 96374 THER/PROPH/DIAG INJ IV PUSH: CPT | Mod: 59

## 2025-01-03 PROCEDURE — 85014 HEMATOCRIT: CPT

## 2025-01-03 PROCEDURE — 250N000009 HC RX 250

## 2025-01-03 PROCEDURE — 84155 ASSAY OF PROTEIN SERUM: CPT

## 2025-01-03 PROCEDURE — 99284 EMERGENCY DEPT VISIT MOD MDM: CPT

## 2025-01-03 PROCEDURE — 83690 ASSAY OF LIPASE: CPT

## 2025-01-03 PROCEDURE — 81003 URINALYSIS AUTO W/O SCOPE: CPT

## 2025-01-03 RX ORDER — IOPAMIDOL 755 MG/ML
149 INJECTION, SOLUTION INTRAVASCULAR ONCE
Status: COMPLETED | OUTPATIENT
Start: 2025-01-03 | End: 2025-01-03

## 2025-01-03 RX ORDER — KETOROLAC TROMETHAMINE 15 MG/ML
15 INJECTION, SOLUTION INTRAMUSCULAR; INTRAVENOUS ONCE
Status: COMPLETED | OUTPATIENT
Start: 2025-01-03 | End: 2025-01-03

## 2025-01-03 RX ADMIN — SODIUM CHLORIDE 77 ML: 9 INJECTION, SOLUTION INTRAVENOUS at 12:46

## 2025-01-03 RX ADMIN — IOPAMIDOL 149 ML: 755 INJECTION, SOLUTION INTRAVENOUS at 12:46

## 2025-01-03 RX ADMIN — KETOROLAC TROMETHAMINE 15 MG: 15 INJECTION, SOLUTION INTRAMUSCULAR; INTRAVENOUS at 12:32

## 2025-01-03 ASSESSMENT — COLUMBIA-SUICIDE SEVERITY RATING SCALE - C-SSRS
6. HAVE YOU EVER DONE ANYTHING, STARTED TO DO ANYTHING, OR PREPARED TO DO ANYTHING TO END YOUR LIFE?: NO
2. HAVE YOU ACTUALLY HAD ANY THOUGHTS OF KILLING YOURSELF IN THE PAST MONTH?: NO
1. IN THE PAST MONTH, HAVE YOU WISHED YOU WERE DEAD OR WISHED YOU COULD GO TO SLEEP AND NOT WAKE UP?: NO

## 2025-01-03 ASSESSMENT — ACTIVITIES OF DAILY LIVING (ADL)
ADLS_ACUITY_SCORE: 41

## 2025-01-03 NOTE — ED TRIAGE NOTES
Pt reports RLQ/lower mid abdominal pain for one week. Denies nausea and is having normal BM. Was seen in urgent care.      Triage Assessment (Adult)       Row Name 01/03/25 1101          Triage Assessment    Airway WDL WDL        Respiratory WDL    Respiratory WDL WDL        Cardiac WDL    Cardiac WDL WDL        Cognitive/Neuro/Behavioral WDL    Cognitive/Neuro/Behavioral WDL WDL

## 2025-01-03 NOTE — ED PROVIDER NOTES
"History     Chief Complaint   Patient presents with    Abdominal Pain     RLQ/Lower mid one week       Nahun Payton is a 39 year old male with no significant pmhx who presents for evaluation of lower abdominal pain.  Patient states he developed intermittent periumbilical pain about a week and a half ago.  He was seen in urgent care but they recommended he go to the ER for evaluation.  His pain seemed to get better so he did not present for further evaluation.  However, his pain returned the last couple days and is becoming more persistent.  The pain is now in the infraumbilical and left lower quadrant areas.  It is worse with use of his abdominal wall muscles, but still present at rest.  He denies associated fever, chills, nausea/vomiting, dysuria, hematuria, diarrhea, constipation, hematochezia, testicular pain, scrotal swelling.  No history of prior abdominal surgeries.  No history of hernia.    Allergies:  No Known Allergies    Problem List:    There are no active problems to display for this patient.       Past Medical History:    No past medical history on file.    Past Surgical History:    Past Surgical History:   Procedure Laterality Date    ARTHROSCOPY KNEE Right        Family History:    Family History   Problem Relation Age of Onset    Myocardial Infarction Father     Alcoholism Father     Diabetes Paternal Grandmother        Social History:  Marital Status:  Single [1]  Social History     Tobacco Use    Smoking status: Never     Passive exposure: Never    Smokeless tobacco: Never   Vaping Use    Vaping status: Every Day   Substance Use Topics    Alcohol use: Yes    Drug use: Never        Medications:    albuterol (PROAIR HFA/PROVENTIL HFA/VENTOLIN HFA) 108 (90 Base) MCG/ACT inhaler          Physical Exam   BP: (!) 153/105  Pulse: 56  Temp: 98.1  F (36.7  C)  Resp: 20  Height: 180.3 cm (5' 11\")  Weight: 145.2 kg (320 lb)  SpO2: 96 %      Physical Exam  Vitals and nursing note reviewed. "   Constitutional:       General: He is not in acute distress.     Appearance: He is not ill-appearing, toxic-appearing or diaphoretic.   HENT:      Head: Normocephalic and atraumatic.   Eyes:      Extraocular Movements: Extraocular movements intact.      Pupils: Pupils are equal, round, and reactive to light.   Pulmonary:      Effort: Pulmonary effort is normal.   Abdominal:      Palpations: Abdomen is soft.      Tenderness: There is abdominal tenderness in the suprapubic area and left lower quadrant. There is no guarding or rebound. Negative signs include Richards's sign and McBurney's sign.   Skin:     General: Skin is warm.   Neurological:      General: No focal deficit present.      Mental Status: He is alert and oriented to person, place, and time.   Psychiatric:         Mood and Affect: Mood normal.         Behavior: Behavior normal.         ED Course        Procedures                    Results for orders placed or performed during the hospital encounter of 01/03/25 (from the past 24 hours)   CBC with Platelets & Differential    Narrative    The following orders were created for panel order CBC with Platelets & Differential.  Procedure                               Abnormality         Status                     ---------                               -----------         ------                     CBC with platelets and d...[[id number]]  Abnormal            Final result                 Please view results for these tests on the individual orders.   Comprehensive metabolic panel   Result Value Ref Range    Sodium 140 135 - 145 mmol/L    Potassium 4.0 3.4 - 5.3 mmol/L    Carbon Dioxide (CO2) 24 22 - 29 mmol/L    Anion Gap 14 7 - 15 mmol/L    Urea Nitrogen 13.8 6.0 - 20.0 mg/dL    Creatinine 0.93 0.67 - 1.17 mg/dL    GFR Estimate >90 >60 mL/min/1.73m2    Calcium 9.7 8.8 - 10.4 mg/dL    Chloride 102 98 - 107 mmol/L    Glucose 94 70 - 99 mg/dL    Alkaline Phosphatase 81 40 - 150 U/L    AST 28 0 - 45 U/L    ALT 59 0 -  70 U/L    Protein Total 8.0 6.4 - 8.3 g/dL    Albumin 4.9 3.5 - 5.2 g/dL    Bilirubin Total 0.5 <=1.2 mg/dL   CBC with platelets and differential   Result Value Ref Range    WBC Count 8.7 4.0 - 11.0 10e3/uL    RBC Count 5.72 4.40 - 5.90 10e6/uL    Hemoglobin 15.1 13.3 - 17.7 g/dL    Hematocrit 46.0 40.0 - 53.0 %    MCV 80 78 - 100 fL    MCH 26.4 (L) 26.5 - 33.0 pg    MCHC 32.8 31.5 - 36.5 g/dL    RDW 13.2 10.0 - 15.0 %    Platelet Count 295 150 - 450 10e3/uL    % Neutrophils 53 %    % Lymphocytes 37 %    % Monocytes 7 %    % Eosinophils 1 %    % Basophils 1 %    % Immature Granulocytes 1 %    NRBCs per 100 WBC 0 <1 /100    Absolute Neutrophils 4.6 1.6 - 8.3 10e3/uL    Absolute Lymphocytes 3.2 0.8 - 5.3 10e3/uL    Absolute Monocytes 0.6 0.0 - 1.3 10e3/uL    Absolute Eosinophils 0.1 0.0 - 0.7 10e3/uL    Absolute Basophils 0.1 0.0 - 0.2 10e3/uL    Absolute Immature Granulocytes 0.1 <=0.4 10e3/uL    Absolute NRBCs 0.0 10e3/uL   Lipase   Result Value Ref Range    Lipase 29 13 - 60 U/L   UA with Microscopic reflex to Culture    Specimen: Urine, Clean Catch   Result Value Ref Range    Color Urine Light Yellow Colorless, Straw, Light Yellow, Yellow    Appearance Urine Clear Clear    Glucose Urine Negative Negative mg/dL    Bilirubin Urine Negative Negative    Ketones Urine Negative Negative mg/dL    Specific Gravity Urine 1.021 1.003 - 1.035    Blood Urine Negative Negative    pH Urine 5.5 5.0 - 7.0    Protein Albumin Urine 10 (A) Negative mg/dL    Urobilinogen Urine Normal Normal, 2.0 mg/dL    Nitrite Urine Negative Negative    Leukocyte Esterase Urine Negative Negative    Mucus Urine Present (A) None Seen /LPF    RBC Urine 1 <=2 /HPF    WBC Urine 1 <=5 /HPF    Squamous Epithelials Urine <1 <=1 /HPF    Narrative    Urine Culture not indicated   CT Abdomen Pelvis w Contrast    Narrative    CT ABDOMEN & PELVIS WITH CONTRAST January 3, 2025 12:57 PM    CLINICAL HISTORY: Infraumbilical and left lower quadrant  pain.    TECHNIQUE: CT scan of the abdomen and pelvis was performed following  injection of IV contrast. Multiplanar reformats were obtained. Dose  reduction techniques were used.  CONTRAST: 149 mL Isovue 370.    COMPARISON: None.    FINDINGS:   LOWER CHEST: Normal.    HEPATOBILIARY: Fatty liver. No acute liver or gallbladder abnormality.    PANCREAS: Normal.    SPLEEN: Normal.    ADRENAL GLANDS: Normal.    KIDNEYS/BLADDER: No significant mass, stones, or hydronephrosis. There  are simple or benign cysts. No follow up is needed.    BOWEL: No obstruction. No acute inflammatory change is identified.  Normal appendix. No abscess or free air.    PELVIC ORGANS: Normal.    ADDITIONAL FINDINGS: None.    MUSCULOSKELETAL: Normal.      Impression    IMPRESSION: No acute abnormality identified.       Medications   ketorolac (TORADOL) injection 15 mg (15 mg Intravenous $Given 1/3/25 1232)   iopamidol (ISOVUE-370) solution 149 mL (149 mLs Intravenous $Given 1/3/25 1246)   sodium chloride 0.9 % bag 500mL for CT scan flush use (77 mLs As instructed $Given 1/3/25 1246)       Assessments & Plan (with Medical Decision Making)     I have reviewed the nursing notes.    I have reviewed the findings, diagnosis, plan and need for follow up with the patient.    Medical Decision Making  Nahun Patyon is a 39 year old male with no significant pmhx who presents for evaluation of abdominal pain.  Differential diagnoses include appendicitis, UTI, colitis, diverticulitis, hernia, abdominal wall strain.  Vital signs with hypertension at 153/105.  Patient is afebrile, he is not tachycardic, and he is satting 96% on room air with a regular respiratory rate and effort.    On examination patient is overall well-appearing, alert, no acute distress.  Abdomen is soft to palpation.  There is tenderness in the suprapubic area and left lower quadrant.  No guarding or rebound.  Negative Richards's, negative McBurney's point tenderness.  No palpable  mass or hernia. CMP negative for electrolyte abnormality, renal dysfunction, liver dysfunction. Lipase WNL, low suspicion for pancreatitis. CBC negative for leukocytosis or anemia. UA negative for nitrite, leuk est, RBC, WBC, and does not suggest infection or stone.  CT scan of the abdomen/pelvis was negative for any acute pathology, no findings to explain the patient's pain.    Overall workup is grossly unremarkable for acute pathology.  Patient's pain could be due to muscle strain, possible hernia.  Recommended symptomatic care with ibuprofen and Tylenol as needed, avoidance of heavy lifting or exertion or activities that worsen his pain.  Recommended a follow-up appointment with his primary care provider in the next couple weeks if his pain is not improving.  We discussed strict return precautions if he develops sudden severe pain, fever, vomiting, urinary or stool changes, or if other concerning symptoms develop.  Patient voiced understanding of the plan and had no further questions.      New Prescriptions    No medications on file       Final diagnoses:   Periumbilical abdominal pain       Ariane Witt PA-C  January 3, 2025  [facility] [place] EMERGENCY DEPT     Ariane Witt PA-C  01/03/25 3226

## 2025-01-20 ENCOUNTER — TELEPHONE (OUTPATIENT)
Dept: FAMILY MEDICINE | Facility: CLINIC | Age: 40
End: 2025-01-20

## 2025-01-20 ENCOUNTER — OFFICE VISIT (OUTPATIENT)
Dept: FAMILY MEDICINE | Facility: CLINIC | Age: 40
End: 2025-01-20
Payer: COMMERCIAL

## 2025-01-20 VITALS
HEIGHT: 71 IN | RESPIRATION RATE: 18 BRPM | SYSTOLIC BLOOD PRESSURE: 138 MMHG | WEIGHT: 315 LBS | BODY MASS INDEX: 44.1 KG/M2 | HEART RATE: 75 BPM | OXYGEN SATURATION: 98 % | DIASTOLIC BLOOD PRESSURE: 92 MMHG

## 2025-01-20 DIAGNOSIS — Z00.00 ROUTINE HISTORY AND PHYSICAL EXAMINATION OF ADULT: Primary | ICD-10-CM

## 2025-01-20 DIAGNOSIS — E66.01 MORBID OBESITY (H): ICD-10-CM

## 2025-01-20 DIAGNOSIS — R03.0 ELEVATED BLOOD PRESSURE READING WITHOUT DIAGNOSIS OF HYPERTENSION: ICD-10-CM

## 2025-01-20 PROCEDURE — 99395 PREV VISIT EST AGE 18-39: CPT | Performed by: FAMILY MEDICINE

## 2025-01-20 PROCEDURE — 99214 OFFICE O/P EST MOD 30 MIN: CPT | Mod: 25 | Performed by: FAMILY MEDICINE

## 2025-01-20 RX ORDER — SEMAGLUTIDE 0.5 MG/.5ML
0.5 INJECTION, SOLUTION SUBCUTANEOUS WEEKLY
Qty: 2 ML | Refills: 0 | Status: SHIPPED | OUTPATIENT
Start: 2025-02-17 | End: 2025-03-17

## 2025-01-20 RX ORDER — SEMAGLUTIDE 1 MG/.5ML
1 INJECTION, SOLUTION SUBCUTANEOUS WEEKLY
Qty: 6 ML | Refills: 3 | Status: SHIPPED | OUTPATIENT
Start: 2025-03-21

## 2025-01-20 RX ORDER — SEMAGLUTIDE 0.25 MG/.5ML
0.25 INJECTION, SOLUTION SUBCUTANEOUS WEEKLY
Qty: 2 ML | Refills: 0 | Status: SHIPPED | OUTPATIENT
Start: 2025-01-20 | End: 2025-02-17

## 2025-01-20 SDOH — HEALTH STABILITY: PHYSICAL HEALTH: ON AVERAGE, HOW MANY DAYS PER WEEK DO YOU ENGAGE IN MODERATE TO STRENUOUS EXERCISE (LIKE A BRISK WALK)?: 3 DAYS

## 2025-01-20 SDOH — HEALTH STABILITY: PHYSICAL HEALTH: ON AVERAGE, HOW MANY MINUTES DO YOU ENGAGE IN EXERCISE AT THIS LEVEL?: 30 MIN

## 2025-01-20 ASSESSMENT — SOCIAL DETERMINANTS OF HEALTH (SDOH): HOW OFTEN DO YOU GET TOGETHER WITH FRIENDS OR RELATIVES?: ONCE A WEEK

## 2025-01-20 ASSESSMENT — PAIN SCALES - GENERAL: PAINLEVEL_OUTOF10: NO PAIN (0)

## 2025-01-20 NOTE — PROGRESS NOTES
"Preventive Care Visit  [facility]  Jefferson Canales MD, Family Medicine  Jan 20, 2025      Assessment & Plan     Routine history and physical examination of adult  Blood pressure above target goal of less than 140/90.  Recommended regular exercise, healthy diet and weight loss.  Wegovy prescribed, will be helpful for controlling blood pressure as well.    Elevated blood pressure reading without diagnosis of hypertension  As above    Morbid obesity (H)  Weight management discussed and Wegovy prescribed, written information provided  - semaglutide-weight management (WEGOVY) 0.25 MG/0.5ML pen; Inject 0.5 mLs (0.25 mg) subcutaneously once a week for 28 days.  - Semaglutide-Weight Management (WEGOVY) 0.5 MG/0.5ML pen; Inject 0.5 mg subcutaneously once a week for 28 days.  - Semaglutide-Weight Management (WEGOVY) 1 MG/0.5ML pen; Inject 1 mg subcutaneously once a week.  - insulin pen needle (32G X 4 MM) 32G X 4 MM miscellaneous; Use 1 pen needles daily or as directed.      BMI  Estimated body mass index is 44.21 kg/m  as calculated from the following:    Height as of this encounter: 1.803 m (5' 11\").    Weight as of this encounter: 143.8 kg (317 lb).   Weight management plan: Discussed healthy diet and exercise guidelines    Counseling  Appropriate preventive services were addressed with this patient via screening, questionnaire, or discussion as appropriate for fall prevention, nutrition, physical activity, Tobacco-use cessation, social engagement, weight loss and cognition.  Checklist reviewing preventive services available has been given to the patient.  Reviewed patient's diet, addressing concerns and/or questions.   He is at risk for lack of exercise and has been provided with information to increase physical activity for the benefit of his well-being.         Mirlande Garnica is a 39 year old, presenting for the following:  Physical        1/20/2025     9:27 AM   Additional Questions "   Roomed by Ana Ren CMA   Accompanied by Self          HPI        1/20/2025   General Health   How would you rate your overall physical health? Good   Feel stress (tense, anxious, or unable to sleep) Not at all         1/20/2025   Nutrition   Three or more servings of calcium each day? (!) I DON'T KNOW   Diet: Regular (no restrictions)   How many servings of fruit and vegetables per day? (!) 0-1   How many sweetened beverages each day? 0-1         1/20/2025   Exercise   Days per week of moderate/strenous exercise 3 days   Average minutes spent exercising at this level 30 min         1/20/2025   Social Factors   Frequency of gathering with friends or relatives Once a week   Worry food won't last until get money to buy more No   Food not last or not have enough money for food? No   Do you have housing? (Housing is defined as stable permanent housing and does not include staying ouside in a car, in a tent, in an abandoned building, in an overnight shelter, or couch-surfing.) Yes   Are you worried about losing your housing? No   Lack of transportation? No   Unable to get utilities (heat,electricity)? No         1/20/2025   Dental   Dentist two times every year? Yes         12/8/2024   TB Screening   Were you born outside of the [place]? No         Today's PHQ-2 Score:       1/20/2025     4:16 AM   PHQ-2 ( 1999 [employer])   Q1: Little interest or pleasure in doing things 0   Q2: Feeling down, depressed or hopeless 0   PHQ-2 Score 0    Q1: Little interest or pleasure in doing things Not at all   Q2: Feeling down, depressed or hopeless Not at all   PHQ-2 Score 0       Patient-reported           1/20/2025   Substance Use   Alcohol more than 3/day or more than 7/wk No   Do you use any other substances recreationally? No     Social History     Tobacco Use    Smoking status: Never     Passive exposure: Never    Smokeless tobacco: Current    Tobacco comments:     Oral nicotine pouches   Vaping Use    Vaping status: Former    Substance Use Topics    Alcohol use: Yes    Drug use: Never           1/20/2025   STI Screening   New sexual partner(s) since last STI/HIV test? No         1/20/2025   Contraception/Family Planning   Questions about contraception or family planning No        Reviewed and updated as needed this visit by Provider                    History reviewed. No pertinent past medical history.  Past Surgical History:   Procedure Laterality Date    ARTHROSCOPY KNEE Right      OB History   No obstetric history on file.     Lab work is in process  Labs reviewed in EPIC  BP Readings from Last 3 Encounters:   01/20/25 126/78   01/03/25 (!) 166/99   12/26/24 (!) 141/92    Wt Readings from Last 3 Encounters:   01/20/25 143.8 kg (317 lb)   01/03/25 145.2 kg (320 lb)   12/26/24 144.7 kg (319 lb)                  There is no problem list on file for this patient.    Past Surgical History:   Procedure Laterality Date    ARTHROSCOPY KNEE Right        Social History     Tobacco Use    Smoking status: Never     Passive exposure: Never    Smokeless tobacco: Current    Tobacco comments:     Oral nicotine pouches   Substance Use Topics    Alcohol use: Yes     Family History   Problem Relation Age of Onset    Myocardial Infarction Father     Alcoholism Father     Diabetes Paternal Grandmother          Current Outpatient Medications   Medication Sig Dispense Refill    albuterol (PROAIR HFA/PROVENTIL HFA/VENTOLIN HFA) 108 (90 Base) MCG/ACT inhaler Inhale 2 puffs every 4-6 hours as needed for cough, wheezing, or shortness of breath 18 g 0     No Known Allergies  Recent Labs   Lab Test 01/03/25  1157 05/10/23  0724   LDL  --  102*   HDL  --  45   TRIG  --  134   ALT 59  --    CR 0.93  --    GFRESTIMATED >90  --    POTASSIUM 4.0  --         Review of Systems  Constitutional, neuro, ENT, endocrine, pulmonary, cardiac, gastrointestinal, genitourinary, musculoskeletal, integument and psychiatric systems are negative, except as otherwise noted.    "  Objective    Exam  /78   Pulse 75   Resp 18   Ht 1.803 m (5' 11\")   Wt 143.8 kg (317 lb)   SpO2 98%   BMI 44.21 kg/m     Estimated body mass index is 44.21 kg/m  as calculated from the following:    Height as of this encounter: 1.803 m (5' 11\").    Weight as of this encounter: 143.8 kg (317 lb).  Wt Readings from Last 10 Encounters:   01/20/25 143.8 kg (317 lb)   01/03/25 145.2 kg (320 lb)   12/26/24 144.7 kg (319 lb)   10/27/23 137 kg (302 lb)   04/25/23 140.2 kg (309 lb)   11/04/22 139.3 kg (307 lb)   10/12/22 139.3 kg (307 lb 3.2 oz)      Physical Exam  GENERAL: alert and no distress  EYES: Eyes grossly normal to inspection, PERRL and conjunctivae and sclerae normal  HENT: normal cephalic/atraumatic, nose and mouth without ulcers or lesions, oropharynx clear, and oral mucous membranes moist  NECK: no adenopathy, no asymmetry, masses, or scars  RESP: lungs clear to auscultation - no rales, rhonchi or wheezes  CV: regular rates and rhythm, normal S1 S2, no S3 or S4, no murmur, click or rub, peripheral pulses strong, and no peripheral edema  ABDOMEN: soft, nontender, without hepatosplenomegaly or masses  MS: no gross musculoskeletal defects noted, no edema  SKIN: no suspicious lesions or rashes  NEURO: Normal strength and tone, mentation intact and speech normal  PSYCH: mentation appears normal, affect normal/bright        Signed Electronically by: Jefferson Canales MD    "

## 2025-01-20 NOTE — TELEPHONE ENCOUNTER
Prior Authorization Retail Medication Request    Medication/Dose: Wegovy 0.25MG/0.5ML  Diagnosis and ICD code (if different than what is on RX):    New/renewal/insurance change PA/secondary ins. PA:  Previously Tried and Failed:    Rationale:      Covermymeds  Key: [id number]    Pharmacy Information (if different than what is on RX)  Name:    Phone:    Fax:    Clinic Information  Preferred routing pool for dept communication: [id number]

## 2025-01-23 NOTE — TELEPHONE ENCOUNTER
Prior Authorization Approval    Authorization Effective Date: 12/24/2024  Authorization Expiration Date: 8/21/2025  Medication: Wegovy 0.25MG/0.5ML-APPROVED  Reference #:     Insurance Company: [employer] ([employer]) - Phone [phone] Fax [phone]  Which Pharmacy is filling the prescription (Not needed for infusion/clinic administered): [facility] - [place], [place] - [address]  Pharmacy Notified: Yes  Patient Notified: Instructed pharmacy to notify patient when script is ready to /ship.

## 2025-01-23 NOTE — TELEPHONE ENCOUNTER
Retail Pharmacy Prior Authorization Team   Phone: [phone]    PA Initiation    Medication: WEGOVY 0.25 MG/0.5ML SC SOAJ  Insurance Company: [employer] ([employer]) - Phone [phone] Fax [phone]  [place] [facility] [place] [address]  Filling Pharmacy Phone: [phone]  Filling Pharmacy Fax:    Start Date: 1/23/2025